# Patient Record
Sex: MALE | Race: WHITE | NOT HISPANIC OR LATINO | Employment: OTHER | URBAN - METROPOLITAN AREA
[De-identification: names, ages, dates, MRNs, and addresses within clinical notes are randomized per-mention and may not be internally consistent; named-entity substitution may affect disease eponyms.]

---

## 2017-01-03 ENCOUNTER — GENERIC CONVERSION - ENCOUNTER (OUTPATIENT)
Dept: OTHER | Facility: OTHER | Age: 82
End: 2017-01-03

## 2017-01-05 ENCOUNTER — GENERIC CONVERSION - ENCOUNTER (OUTPATIENT)
Dept: OTHER | Facility: OTHER | Age: 82
End: 2017-01-05

## 2017-01-18 ENCOUNTER — GENERIC CONVERSION - ENCOUNTER (OUTPATIENT)
Dept: OTHER | Facility: OTHER | Age: 82
End: 2017-01-18

## 2017-01-25 ENCOUNTER — GENERIC CONVERSION - ENCOUNTER (OUTPATIENT)
Dept: OTHER | Facility: OTHER | Age: 82
End: 2017-01-25

## 2017-01-26 ENCOUNTER — TRANSCRIBE ORDERS (OUTPATIENT)
Dept: NON INVASIVE DIAGNOSTICS | Facility: CLINIC | Age: 82
End: 2017-01-26

## 2017-01-26 ENCOUNTER — HOSPITAL ENCOUNTER (OUTPATIENT)
Dept: NON INVASIVE DIAGNOSTICS | Facility: CLINIC | Age: 82
Discharge: HOME | End: 2017-01-26
Payer: COMMERCIAL

## 2017-01-26 DIAGNOSIS — I71.4 ABDOMINAL AORTIC ANEURYSM WITHOUT RUPTURE (HCC): ICD-10-CM

## 2017-01-26 DIAGNOSIS — I71.4 AAA (ABDOMINAL AORTIC ANEURYSM) WITHOUT RUPTURE (HCC): Primary | ICD-10-CM

## 2017-01-26 PROCEDURE — 93978 VASCULAR STUDY: CPT

## 2017-02-09 ENCOUNTER — GENERIC CONVERSION - ENCOUNTER (OUTPATIENT)
Dept: OTHER | Facility: OTHER | Age: 82
End: 2017-02-09

## 2018-01-10 NOTE — MISCELLANEOUS
History of Present Illness  TCM Communication [facility]: The patient is being contacted for follow-up after hospitalization  He [place] [facility] [place]  The date of discharge: 12/27/16  He was discharged to home  Medications reviewed and updated today  Communication performed and completed by      Active Problems    1  AAA (abdominal aortic aneurysm) (441 4) (I71 4)   2  Arthritis (716 90) (M19 90)   3  Atherosclerosis of native coronary artery (414 01) (I25 10)   4  Benign essential hypertension (401 1) (I10)   5  CAD, multiple vessel (414 00) (I25 10)   6  Chronic obstructive pulmonary disease (496) (J44 9)   7  COPD with acute exacerbation (491 21) (J44 1)   8  Dizziness (780 4) (R42)   9  Falling (E888 9) (R29 6)   10  Fatigue (780 79) (R53 83)   11  Flu vaccine need (V04 81) (Z23)   12  Former smoker (S69 99) (H06 700)   13  Hyperlipidemia (272 4) (E78 5)   14  Internal hemorrhoids (455 0) (K64 8)   15  Mass on back (782 2) (R22 2)   16  Need for pneumococcal vaccination (V03 82) (Z23)   17  Pleuritic pain (786 52) (R07 81)   18  Prominent popliteal pulse (785 9) (R09 89)   19  Prostate cancer, primary, with metastasis from prostate to other site (185,199 1) (C61)   20  Recurrent falls (V15 88) (R29 6)   21  SVT (supraventricular tachycardia) (427 89) (I47 1)   22  Tobacco use (305 1) (Z72 0)   23  Venous insufficiency (459 81) (I87 2)   24  Wrist fracture, left (814 00) (S62 102A)    Past Medical History    1  History of Acute viral syndrome (079 99) (B34 9)   2  History of Allergic rhinitis (477 9) (J30 9)   3  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)   4  History of Carpal tunnel syndrome, unspecified laterality (354 0) (G56 00)   5  History of Cellulitis of leg (682 6) (L03 119)   6  History of Colon Cancer (V10 05)   7  History of Colonoscopy (Fiberoptic)   8  History of Coronary Artery Disease (V12 59)   9  History of Cough (786 2) (R05)   10   History of Elevated prostate specific antigen (PSA) (790 93) (R97 20)   11  History of acute bronchitis (V12 69) (Z87 09)   12  History of acute otitis media (V12 49) (Z86 69)   13  History of benign neoplasm of skin (V13 3) (Z87 2)   14  History of cardiac arrhythmia (V12 59) (Z86 79)   15  History of chest pain (V13 89) (Z87 898)   16  History of colon cancer (V10 05) (Z85 038)   17  History of hyperlipidemia (V12 29) (Z86 39)   18  History of hypertension (V12 59) (Z86 79)   19  History of pneumonia (V12 61) (Z87 01)   20  History of wheezing (V12 69) (Z87 898)   21  History of Inflamed seborrheic keratosis (702 11) (L82 0)   22  History of Lumbar canal stenosis (724 02) (M48 06)   23  History of Malignant Neoplasm Of Large Intestine (V10 05)   24  History of Malignant Neoplasm Of The Prostate Gland (V10 46)   25  History of Malignant Rectal Neoplasm (V10 06)   26  History of Muscle weakness (generalized) (728 87) (M62 81)   27  Old myocardial infarction (412) (I25 2)   28  History of Preoperative cardiovascular examination (V72 81) (Z01 810)   29  History of Rectal hemorrhage (569 3) (K62 5)   30  History of Spinal stenosis (724 00) (M48 00)   31  History of Thoracic Aortic Ectasia (447 71)    Surgical History    1  History of Appendectomy   2  History of Back Surgery   3  History of Cardiac Cath Lesion ,  Adjunct Treat Device: Stent   4  History of Inguinal Hernia Repair   5  History of Knee Surgery   6  History of Neuroplasty Decompression Median Nerve At Carpal Tunnel   7  History of Partial Hip Replacement With Prosthesis   8  History of PTCA   9  History of Radiation Therapy   10  History of Revision Of Anastomosis Of Large Intestine    Family History  Mother    1  Family history of Mother  At Age [de-identified]  Father    2  Family history of Chronic Obstructive Pulmonary Disease   3   Family history of Father  At Age 80    Social History    · Alcohol Use (History)   · Current Every Day Smoker (305 1)   · Denied: History of Drug Use   · Former smoker   · Former smoker (A46 80) (I96 744)   · Occupation: Retired   · Smoking Cigarettes - Light (1-10 / Day)   · Tobacco use (305 1) (Z72 0)    Current Meds   1  Advair Diskus 250-50 MCG/DOSE Inhalation Aerosol Powder Breath Activated; INHALE 1   PUFF TWICE DAILY; Therapy: 87ZXW3774 to (Last Rx:14Oct2015)  Requested for: 14Oct2015 Ordered   2  Alphagan P 0 1 % Ophthalmic Solution; INSTILL 1 DROP INTO BOTH EYES EVERY 8   HOURS Recorded   3  Amiodarone HCl - 100 MG Oral Tablet; TAKE 1 TABLET DAILY  Requested for: 70Ycr3261;   Last Rx:35Iis3915 Ordered   4  Atorvastatin Calcium 20 MG Oral Tablet; Take 1 tablet daily; Therapy: 84BBL1517 to (Last Rx:22Dhu0245)  Requested for: 35EQU3304 Ordered   5  Clopidogrel Bisulfate 75 MG Oral Tablet; Take 1 tablet daily; Therapy: 94OQQ3290 to (Last Rx:95Zte4952)  Requested for: 78FDJ1038 Ordered   6  Ecotrin Low Strength 81 MG Oral Tablet Delayed Release; take 1 tablet every other day; Therapy: 34YYT1363 to (Last Rx:24Nsm3196) Ordered   7  Flomax 0 4 MG Oral Capsule; TAKE 1 CAPSULE Weekly; Therapy: 03RFI8632 to Recorded   8  Metoprolol Tartrate 50 MG Oral Tablet; Take 1 tablet twice daily  Requested for:   39Hws4807; Last Rx:62Nhf8175 Ordered   9  Ventolin  (90 Base) MCG/ACT Inhalation Aerosol Solution; INHALE 1 PUFF   EVERY 4 HOURS AS NEEDED; Therapy: 52FWS3550 to (Last Rx:91Hlc3648)  Requested for: 25PDD6936 Ordered   10  VESIcare 10 MG Oral Tablet; TAKE 1 TABLET ONCE  MAY REPEAT IN 2 DAYS; Therapy: 46UGJ7080- Recorded   11  Voltaren 1 % GEL; APPLY TO UPPER EXTREMITIES, 2 GM OF GEL TO AFFECTED AREA    4 TIMES DAILY  DO NOT APPLY MORE THAN 8 GM DAILY TO ANY ONE AFFECTED    JOINT Recorded   12  Xtandi 40 MG Oral Capsule; 4 tablets at night; Therapy: (Recorded:04Iia3017) to Recorded    Allergies    1  Pork Derived Products   2  TETANUS    Health Management  Health Maintenance   (1) PSA, DIAGNOSTIC (FOLLOW-UP); every 1 year;  Next Due: 78VVG3850; Overdue  COLONOSCOPY; every 5 years; Last 66GWJ8295; Next Due: 85Ukn5458; Active    Future Appointments    Date/Time Provider Specialty Site   04/06/2017 03:00 PM JAMIE Fernandez   Cardiology [facility]   02/14/2017 01:45 PM Deshaun Vasquez MD Vascular Surgery [facility]ULAR [facility]     Signatures   Electronically signed by : Gilles Robles [facility]; Jan 20 2017  8:57AM EST                       (Author)    Electronically signed by : JAMIE Obrien ; Jan 20 2017  9:02AM EST

## 2018-01-12 NOTE — MISCELLANEOUS
History of Present Illness  TCM Communication [facility]: The patient is being contacted for follow-up after hospitalization and d/c to hospice  He was hospitalized at [facility] [place]  The date of admission: 2/7/17, date of discharge: 2/8/17  Diagnosis: confusion, new right brain mass  He was discharged hospice  Medications were not reviewed today  Counseling was provided to  did not call, hospice  Communication performed and completed by leonie gooden      Active Problems    1  AAA (abdominal aortic aneurysm) (441 4) (I71 4)   2  Arthritis (716 90) (M19 90)   3  Atherosclerosis of native coronary artery (414 01) (I25 10)   4  Benign essential hypertension (401 1) (I10)   5  CAD, multiple vessel (414 00) (I25 10)   6  Chronic obstructive pulmonary disease (496) (J44 9)   7  COPD with acute exacerbation (491 21) (J44 1)   8  Dizziness (780 4) (R42)   9  Falling (E888 9) (R29 6)   10  Fatigue (780 79) (R53 83)   11  Flu vaccine need (V04 81) (Z23)   12  Former smoker (X49 84) (B96 451)   13  Hyperlipidemia (272 4) (E78 5)   14  Internal hemorrhoids (455 0) (K64 8)   15  Mass on back (782 2) (R22 2)   16  Need for pneumococcal vaccination (V03 82) (Z23)   17  Pleuritic pain (786 52) (R07 81)   18  Prominent popliteal pulse (785 9) (R09 89)   19  Prostate cancer, primary, with metastasis from prostate to other site (185,199 1) (C61)   20  Recurrent falls (V15 88) (R29 6)   21  SVT (supraventricular tachycardia) (427 89) (I47 1)   22  Tobacco use (305 1) (Z72 0)   23  Venous insufficiency (459 81) (I87 2)   24  Wrist fracture, left (814 00) (S62 102A)    Past Medical History    1  History of Acute viral syndrome (079 99) (B34 9)   2  History of Allergic rhinitis (477 9) (J30 9)   3  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)   4  History of Carpal tunnel syndrome, unspecified laterality (354 0) (G56 00)   5  History of Cellulitis of leg (682 6) (L03 119)   6  History of Colon Cancer (V10 05)   7   History of Colonoscopy (Fiberoptic)   8  History of Coronary Artery Disease (V12 59)   9  History of Cough (786 2) (R05)   10  History of Elevated prostate specific antigen (PSA) (790 93) (R97 20)   11  History of acute bronchitis (V12 69) (Z87 09)   12  History of acute otitis media (V12 49) (Z86 69)   13  History of benign neoplasm of skin (V13 3) (Z87 2)   14  History of cardiac arrhythmia (V12 59) (Z86 79)   15  History of chest pain (V13 89) (Z87 898)   16  History of colon cancer (V10 05) (Z85 038)   17  History of hyperlipidemia (V12 29) (Z86 39)   18  History of hypertension (V12 59) (Z86 79)   19  History of pneumonia (V12 61) (Z87 01)   20  History of wheezing (V12 69) (Z87 898)   21  History of Inflamed seborrheic keratosis (702 11) (L82 0)   22  History of Lumbar canal stenosis (724 02) (M48 06)   23  History of Malignant Neoplasm Of Large Intestine (V10 05)   24  History of Malignant Neoplasm Of The Prostate Gland (V10 46)   25  History of Malignant Rectal Neoplasm (V10 06)   26  History of Muscle weakness (generalized) (728 87) (M62 81)   27  Old myocardial infarction (412) (I25 2)   28  History of Preoperative cardiovascular examination (V72 81) (Z01 810)   29  History of Rectal hemorrhage (569 3) (K62 5)   30  History of Spinal stenosis (724 00) (M48 00)   31  History of Thoracic Aortic Ectasia (447 71)    Surgical History    1  History of Appendectomy   2  History of Back Surgery   3  History of Cardiac Cath Lesion 1, 1st Adjunct Treat Device: Stent   4  History of Inguinal Hernia Repair   5  History of Knee Surgery   6  History of Neuroplasty Decompression Median Nerve At Carpal Tunnel   7  History of Partial Hip Replacement With Prosthesis   8  History of PTCA   9  History of Radiation Therapy   10  History of Revision Of Anastomosis Of Large Intestine    Family History  Mother    1  Family history of Mother  At Age [de-identified]  Father    2  Family history of Chronic Obstructive Pulmonary Disease   3   Family history of Father  At Age 80    Social History    · Alcohol Use (History)   · Current Every Day Smoker (305 1)   · Denied: History of Drug Use   · Former smoker   · Former smoker (V15 82) (C17 810)   · Occupation: Retired   · Smoking Cigarettes - Light (1-10 / Day)   · Tobacco use (305 1) (Z72 0)    Current Meds   1  Advair Diskus 250-50 MCG/DOSE Inhalation Aerosol Powder Breath Activated; INHALE 1   PUFF TWICE DAILY; Therapy: 85DEK3651 to (Last Rx:2015)  Requested for: 2015 Ordered   2  Alphagan P 0 1 % Ophthalmic Solution; INSTILL 1 DROP INTO BOTH EYES EVERY 8   HOURS Recorded   3  Amiodarone HCl - 100 MG Oral Tablet; Take 1 tablet by mouth daily; Therapy: 49YYY7989 to (Evaluate:2018)  Requested for: 12DKK2720; Last   Rx:2017 Ordered   4  Atorvastatin Calcium 20 MG Oral Tablet; Take 1 tablet daily; Therapy: 28RHC5851 to (Last Rx:59Yrq2775)  Requested for: 62TVR1901 Ordered   5  Clopidogrel Bisulfate 75 MG Oral Tablet; Take 1 tablet daily; Therapy: 15UAW1943 to (Last Rx:29Vfn1443)  Requested for: 10HDR3807 Ordered   6  Ecotrin Low Strength 81 MG Oral Tablet Delayed Release; take 1 tablet every other day; Therapy: 35AUR8561 to (Last Rx:39Ndp3623) Ordered   7  Flomax 0 4 MG Oral Capsule (Tamsulosin HCl); TAKE 1 CAPSULE Weekly; Therapy: 48YUA3072 to Recorded   8  Metoprolol Tartrate 50 MG Oral Tablet; Take 1 tablet twice daily  Requested for:   31Zsr4250; Last Rx:99Ysf4184 Ordered   9  Ventolin  (90 Base) MCG/ACT Inhalation Aerosol Solution; INHALE 1 PUFF   EVERY 4 HOURS AS NEEDED; Therapy: 33SPC5072 to (Last Rx:44Fuc8456)  Requested for: 96KAU7153 Ordered   10  VESIcare 10 MG Oral Tablet; TAKE 1 TABLET ONCE  MAY REPEAT IN 2 DAYS; Therapy: 02CCS5358- Recorded   11  Voltaren 1 % GEL; APPLY TO UPPER EXTREMITIES, 2 GM OF GEL TO AFFECTED AREA    4 TIMES DAILY  DO NOT APPLY MORE THAN 8 GM DAILY TO ANY ONE AFFECTED    JOINT Recorded   12   Xtandi 40 MG Oral Capsule; 4 tablets at night; Therapy: (Recorded:67Tak7176) to Recorded    Allergies    1  Pork Derived Products   2  TETANUS    Health Management  Health Maintenance   (1) PSA, DIAGNOSTIC (FOLLOW-UP); every 1 year; Next Due: 49ECJ7149; Overdue  COLONOSCOPY; every 5 years; Last 94QAP1331; Next Due: 57Ftb6429; Active    Future Appointments    Date/Time Provider Specialty Site   04/06/2017 03:00 PM JAMIE Galarza   Cardiology [facility]   02/14/2017 01:45 PM Adele Kayser, MD Vascular Surgery [facility]     Signatures   Electronically signed by : Paxton Watt, [facility]; Feb 9 2017 12:23PM EST                       (Author)    Electronically signed by : JAMIE Noriega ; Feb 9 2017  1:12PM EST

## 2018-01-13 NOTE — MISCELLANEOUS
Message   Date: 04 Mar 2016 8:47 PM EST, Recorded By: Chip Moore For: Norma Hinkle   Caller: Javier Bloom, Spouse   Phone: [phone]   Reason: General Medical Question   Answering service 3/4/2016 8:47 pm   In hospital at [facility] with a broken hip  Needs a recommendation for an orthopedic doctor       Plan   Please call the inpatient on-call physician for a surgeon's name which the spouse wanted for her      Signatures   Electronically signed by : JAMIE Morgan ; Mar  7 2016 10:18AM EST                       (Author)